# Patient Record
Sex: FEMALE | Race: WHITE | NOT HISPANIC OR LATINO | Employment: UNEMPLOYED | URBAN - METROPOLITAN AREA
[De-identification: names, ages, dates, MRNs, and addresses within clinical notes are randomized per-mention and may not be internally consistent; named-entity substitution may affect disease eponyms.]

---
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
## 2020-02-02 ENCOUNTER — HOSPITAL ENCOUNTER (EMERGENCY)
Facility: HOSPITAL | Age: 20
Discharge: HOME | End: 2020-02-02
Attending: EMERGENCY MEDICINE | Admitting: EMERGENCY MEDICINE
Payer: COMMERCIAL

## 2020-02-02 VITALS
HEART RATE: 87 BPM | OXYGEN SATURATION: 98 % | TEMPERATURE: 98.2 F | DIASTOLIC BLOOD PRESSURE: 55 MMHG | SYSTOLIC BLOOD PRESSURE: 118 MMHG | RESPIRATION RATE: 16 BRPM | WEIGHT: 151.01 LBS

## 2020-02-02 DIAGNOSIS — Z20.2 EXPOSURE TO STD: Primary | ICD-10-CM

## 2020-02-02 LAB
AMORPH URATE CRY URNS QL MICRO: ABNORMAL /HPF
BACTERIA UR QL AUTO: ABNORMAL /HPF
BILIRUB UR QL STRIP: NEGATIVE
CLARITY UR: ABNORMAL
COLOR UR: YELLOW
EXT PREG TEST URINE: NORMAL
EXT. CONTROL ED NAV: NORMAL
GLUCOSE UR STRIP-MCNC: NEGATIVE MG/DL
HGB UR QL STRIP.AUTO: ABNORMAL
KETONES UR STRIP-MCNC: NEGATIVE MG/DL
LEUKOCYTE ESTERASE UR QL STRIP: NEGATIVE
NITRITE UR QL STRIP: NEGATIVE
NON-SQ EPI CELLS URNS QL MICRO: ABNORMAL /HPF
PH UR STRIP.AUTO: 5 [PH] (ref 4.5–8)
PROT UR STRIP-MCNC: ABNORMAL MG/DL
RBC #/AREA URNS AUTO: ABNORMAL /HPF
SP GR UR STRIP.AUTO: >=1.03 (ref 1–1.03)
UROBILINOGEN UR QL STRIP.AUTO: 0.2 E.U./DL
WBC #/AREA URNS AUTO: ABNORMAL /HPF

## 2020-02-02 PROCEDURE — 99283 EMERGENCY DEPT VISIT LOW MDM: CPT | Performed by: EMERGENCY MEDICINE

## 2020-02-02 PROCEDURE — 96372 THER/PROPH/DIAG INJ SC/IM: CPT

## 2020-02-02 PROCEDURE — 99283 EMERGENCY DEPT VISIT LOW MDM: CPT

## 2020-02-02 PROCEDURE — 87491 CHLMYD TRACH DNA AMP PROBE: CPT | Performed by: PHYSICIAN ASSISTANT

## 2020-02-02 PROCEDURE — 81001 URINALYSIS AUTO W/SCOPE: CPT

## 2020-02-02 PROCEDURE — 87591 N.GONORRHOEAE DNA AMP PROB: CPT | Performed by: PHYSICIAN ASSISTANT

## 2020-02-02 PROCEDURE — 81025 URINE PREGNANCY TEST: CPT | Performed by: PHYSICIAN ASSISTANT

## 2020-02-02 RX ORDER — AZITHROMYCIN 250 MG/1
1000 TABLET, FILM COATED ORAL EVERY 24 HOURS
Status: DISCONTINUED | OUTPATIENT
Start: 2020-02-02 | End: 2020-02-02 | Stop reason: HOSPADM

## 2020-02-02 RX ADMIN — AZITHROMYCIN 1000 MG: 250 TABLET, FILM COATED ORAL at 17:09

## 2020-02-02 RX ADMIN — CEFTRIAXONE SODIUM 250 MG: 250 INJECTION, POWDER, FOR SOLUTION INTRAMUSCULAR; INTRAVENOUS at 17:10

## 2020-02-02 NOTE — ED PROVIDER NOTES
History  Chief Complaint   Patient presents with    Exposure to STD     Known exposure to chlamydia 1 week ago   Possible UTI     Cloudy urine, foul odor  Denies pain  Patient is a 22 y/o female with history of chlamydia, presents emergency department for evaluation of exposure to STD and possibly UTI  Patient states she is in a monogamous relationship with male partner for the past 5 months, male partner was recently diagnosed with chlamydia last week  Patient states last week she began vaginal discharge, cloudy urine  Patient states she has history of chlamydia in the past and symptoms are similar  Patient denies fever, chills, abdominal pain, pelvic pain, chest pain, flank pain, vaginal bleeding  Prior to Admission Medications   Prescriptions Last Dose Informant Patient Reported? Taking?   naproxen (NAPROSYN) 500 mg tablet   No No   Sig: Take 1 tablet (500 mg total) by mouth 2 (two) times a day as needed for mild pain or moderate pain for up to 10 days      Facility-Administered Medications: None       History reviewed  No pertinent past medical history  Past Surgical History:   Procedure Laterality Date    SKIN GRAFT         History reviewed  No pertinent family history  I have reviewed and agree with the history as documented  Social History     Tobacco Use    Smoking status: Current Every Day Smoker     Packs/day: 0 50    Smokeless tobacco: Never Used   Substance Use Topics    Alcohol use: Yes     Comment: socially    Drug use: Never        Review of Systems   Constitutional: Negative for chills and fever  HENT: Negative for ear pain and sore throat  Eyes: Negative for redness  Respiratory: Negative for chest tightness and shortness of breath  Cardiovascular: Negative for chest pain, palpitations and leg swelling  Gastrointestinal: Negative for abdominal pain, diarrhea, nausea and vomiting  Genitourinary: Positive for dysuria and vaginal discharge   Negative for flank pain, hematuria, pelvic pain, vaginal bleeding and vaginal pain  Musculoskeletal: Negative for back pain and neck pain  Skin: Negative for rash  Neurological: Negative for weakness and headaches  Psychiatric/Behavioral: Negative for confusion  Physical Exam  Physical Exam   Constitutional: She is oriented to person, place, and time  She appears well-developed and well-nourished  Non-toxic appearance  She does not have a sickly appearance  She does not appear ill  HENT:   Head: Normocephalic and atraumatic  Nose: Nose normal    Mouth/Throat: Mucous membranes are normal    Neck: Normal range of motion  Cardiovascular: Normal rate and regular rhythm  Pulmonary/Chest: Effort normal and breath sounds normal    Abdominal: Soft  Normal appearance  There is no tenderness  There is no rigidity, no rebound, no guarding and no CVA tenderness  Genitourinary:   Genitourinary Comments: Patient politely declined pelvic exam   Musculoskeletal: Normal range of motion  Neurological: She is alert and oriented to person, place, and time  Skin: Skin is warm and dry  Capillary refill takes less than 2 seconds  Psychiatric: She has a normal mood and affect   Her behavior is normal        Vital Signs  ED Triage Vitals [02/02/20 1640]   Temperature Pulse Respirations Blood Pressure SpO2   98 2 °F (36 8 °C) 87 16 118/55 98 %      Temp Source Heart Rate Source Patient Position - Orthostatic VS BP Location FiO2 (%)   Temporal Monitor Sitting Right arm --      Pain Score       3           Vitals:    02/02/20 1640   BP: 118/55   Pulse: 87   Patient Position - Orthostatic VS: Sitting         Visual Acuity      ED Medications  Medications   cefTRIAXone (ROCEPHIN) 250 mg in sterile water IM only syringe (250 mg Intramuscular Given 2/2/20 1710)       Diagnostic Studies  Results Reviewed     Procedure Component Value Units Date/Time    Chlamydia/GC amplified DNA by PCR [[id number]] Collected:  02/02/20 1702    Lab Status: In process Specimen:  Urine, Other Updated:  02/02/20 2035    Urine Microscopic [[id number]]  (Abnormal) Collected:  02/02/20 1707    Lab Status:  Final result Specimen:  Urine, Clean Catch Updated:  02/02/20 1829     RBC, UA 1-2 /hpf      WBC, UA 0-1 /hpf      Epithelial Cells Occasional /hpf      Bacteria, UA Moderate /hpf      AMORPH URATES Innumerable /hpf     POCT urinalysis dipstick [[id number]]  (Abnormal) Resulted:  02/02/20 1709    Lab Status:  Final result Specimen:  Urine Updated:  02/02/20 1709    POCT pregnancy, urine [[id number]]  (Normal) Resulted:  02/02/20 1709    Lab Status:  Final result Updated:  02/02/20 1709     EXT PREG TEST UR (Ref: Negative) NEG (-)     Control VALID    Urine Macroscopic, POC [[id number]]  (Abnormal) Collected:  02/02/20 1707    Lab Status:  Final result Specimen:  Urine Updated:  02/02/20 1708     Color, UA Yellow     Clarity, UA Cloudy     pH, UA 5 0     Leukocytes, UA Negative     Nitrite, UA Negative     Protein, UA Trace mg/dl      Glucose, UA Negative mg/dl      Ketones, UA Negative mg/dl      Urobilinogen, UA 0 2 E U /dl      Bilirubin, UA Negative     Blood, UA Large     Specific Gravity, UA >=1 030    Narrative:       CLINITEK RESULT                 No orders to display              Procedures  Procedures         ED Course  ED Course as of Feb 03 0151   Sun Feb 02, 2020   1714 PREGNANCY TEST URINE: NEG (-)                               MDM  Number of Diagnoses or Management Options  Exposure to STD: new and does not require workup  Diagnosis management comments: Patient is a 22 y/o female with history of chlamydia, presents emergency department for evaluation of exposure to STD and possibly UTI  Patient states she is in a monogamous relationship with male partner for the past 5 months, male partner was recently diagnosed with chlamydia last week  Patient states last week she began vaginal discharge, cloudy urine    Patient states she has history of chlamydia in the past and symptoms are similar  On arrival to emergency department, patient well-appearing, nontoxic, afebrile and in no acute distress  Pelvic exam offered to patient given vaginal discharge  Patient politely declined exam and states she has an appointment with her OBGYN upcoming  Pt was treated for gonorrhea/chlamydia prophylatically in ED with ceftriaxone and azithromycin  UA shows no signs of infection  I provided extensive education to the patient about STD prevention and testing   I recommend she contact her partners for testing as well  Advised pt to f/u with her OBGYN for re-evaluation  Pt verbalizes understanding and agrees with plan  The management plan was discussed in detail with the patient at bedside and all questions were answered  Prior to discharge, I provided both verbal and written instructions  I discussed with the patient the signs and symptoms for which to return to the emergency department  All questions were answered and patient was comfortable with the plan of care and discharged to home  The patient verbalized understanding of our discussion and plan of care, and agrees to return to the Emergency Department for concerns and progression of illness  Disposition  Final diagnoses:   Exposure to STD     Time reflects when diagnosis was documented in both MDM as applicable and the Disposition within this note     Time User Action Codes Description Comment    2/2/2020  5:38 PM Corie Melendez Add [Z20 2] Exposure to STD       ED Disposition     ED Disposition Condition Date/Time Comment    Discharge Stable Sun Feb 2, 2020  5:38  [facility] discharge to home/self care              Follow-up Information     Follow up With Specialties Details Why Contact Info    OBGYN  Schedule an appointment as soon as possible for a visit             Discharge Medication List as of 2/2/2020  5:39 PM      CONTINUE these medications which have NOT CHANGED    Details   naproxen (NAPROSYN) 500 mg tablet Take 1 tablet (500 mg total) by mouth 2 (two) times a day as needed for mild pain or moderate pain for up to 10 days, Starting Wed 5/29/2019, Until Sat 6/8/2019, Print           No discharge procedures on file      ED Provider  Electronically Signed by           Porsche Pena PA-C  02/03/20 0151

## 2020-02-02 NOTE — DISCHARGE INSTRUCTIONS
Sexually Transmitted Disease Clinics:    [facility]  [phone]  Tuesday 2p-4p  Wednesday 6p-8p  Free confidential treatment; walk-in    [facility]  Mondays only 5p-7p    [facility]:  [address] clinic - [address]  Tuesday 2p-4p  Wednesday [facility]  [phone]

## 2020-02-04 LAB
C TRACH DNA SPEC QL NAA+PROBE: POSITIVE
N GONORRHOEA DNA SPEC QL NAA+PROBE: NEGATIVE